# Patient Record
Sex: FEMALE | Race: OTHER | NOT HISPANIC OR LATINO
[De-identification: names, ages, dates, MRNs, and addresses within clinical notes are randomized per-mention and may not be internally consistent; named-entity substitution may affect disease eponyms.]

---

## 2020-12-08 ENCOUNTER — APPOINTMENT (OUTPATIENT)
Dept: OTOLARYNGOLOGY | Facility: CLINIC | Age: 55
End: 2020-12-08

## 2021-11-15 ENCOUNTER — EMERGENCY (EMERGENCY)
Facility: HOSPITAL | Age: 56
LOS: 1 days | Discharge: HOME | End: 2021-11-15
Attending: EMERGENCY MEDICINE
Payer: COMMERCIAL

## 2021-11-15 VITALS
OXYGEN SATURATION: 100 % | HEART RATE: 62 BPM | RESPIRATION RATE: 16 BRPM | SYSTOLIC BLOOD PRESSURE: 136 MMHG | HEIGHT: 67 IN | DIASTOLIC BLOOD PRESSURE: 86 MMHG | WEIGHT: 145.06 LBS

## 2021-11-15 DIAGNOSIS — Z85.819 PERSONAL HISTORY OF MALIGNANT NEOPLASM OF UNSPECIFIED SITE OF LIP, ORAL CAVITY, AND PHARYNX: Chronic | ICD-10-CM

## 2021-11-15 LAB
ALBUMIN SERPL ELPH-MCNC: 4.5 G/DL — SIGNIFICANT CHANGE UP (ref 3.3–5)
ALP SERPL-CCNC: 86 U/L — SIGNIFICANT CHANGE UP (ref 40–120)
ALT FLD-CCNC: 12 U/L — SIGNIFICANT CHANGE UP (ref 10–45)
ANION GAP SERPL CALC-SCNC: 12 MMOL/L — SIGNIFICANT CHANGE UP (ref 5–17)
AST SERPL-CCNC: 14 U/L — SIGNIFICANT CHANGE UP (ref 10–40)
BASOPHILS # BLD AUTO: 0.07 K/UL — SIGNIFICANT CHANGE UP (ref 0–0.2)
BASOPHILS NFR BLD AUTO: 1 % — SIGNIFICANT CHANGE UP (ref 0–2)
BILIRUB SERPL-MCNC: 0.4 MG/DL — SIGNIFICANT CHANGE UP (ref 0.2–1.2)
BUN SERPL-MCNC: 8 MG/DL — SIGNIFICANT CHANGE UP (ref 7–23)
CALCIUM SERPL-MCNC: 9.7 MG/DL — SIGNIFICANT CHANGE UP (ref 8.4–10.5)
CHLORIDE SERPL-SCNC: 105 MMOL/L — SIGNIFICANT CHANGE UP (ref 96–108)
CO2 SERPL-SCNC: 24 MMOL/L — SIGNIFICANT CHANGE UP (ref 22–31)
CREAT SERPL-MCNC: 0.49 MG/DL — LOW (ref 0.5–1.3)
EOSINOPHIL # BLD AUTO: 0.36 K/UL — SIGNIFICANT CHANGE UP (ref 0–0.5)
EOSINOPHIL NFR BLD AUTO: 5.1 % — SIGNIFICANT CHANGE UP (ref 0–6)
GLUCOSE SERPL-MCNC: 89 MG/DL — SIGNIFICANT CHANGE UP (ref 70–99)
HCG SERPL-ACNC: 2.2 MIU/ML — SIGNIFICANT CHANGE UP
HCT VFR BLD CALC: 38.9 % — SIGNIFICANT CHANGE UP (ref 34.5–45)
HGB BLD-MCNC: 12 G/DL — SIGNIFICANT CHANGE UP (ref 11.5–15.5)
IMM GRANULOCYTES NFR BLD AUTO: 0.3 % — SIGNIFICANT CHANGE UP (ref 0–1.5)
LIDOCAIN IGE QN: 21 U/L — SIGNIFICANT CHANGE UP (ref 7–60)
LYMPHOCYTES # BLD AUTO: 0.83 K/UL — LOW (ref 1–3.3)
LYMPHOCYTES # BLD AUTO: 11.9 % — LOW (ref 13–44)
MCHC RBC-ENTMCNC: 23.7 PG — LOW (ref 27–34)
MCHC RBC-ENTMCNC: 30.8 GM/DL — LOW (ref 32–36)
MCV RBC AUTO: 76.7 FL — LOW (ref 80–100)
MONOCYTES # BLD AUTO: 0.31 K/UL — SIGNIFICANT CHANGE UP (ref 0–0.9)
MONOCYTES NFR BLD AUTO: 4.4 % — SIGNIFICANT CHANGE UP (ref 2–14)
NEUTROPHILS # BLD AUTO: 5.41 K/UL — SIGNIFICANT CHANGE UP (ref 1.8–7.4)
NEUTROPHILS NFR BLD AUTO: 77.3 % — HIGH (ref 43–77)
NRBC # BLD: 0 /100 WBCS — SIGNIFICANT CHANGE UP (ref 0–0)
PLATELET # BLD AUTO: 271 K/UL — SIGNIFICANT CHANGE UP (ref 150–400)
POTASSIUM SERPL-MCNC: 3.5 MMOL/L — SIGNIFICANT CHANGE UP (ref 3.5–5.3)
POTASSIUM SERPL-SCNC: 3.5 MMOL/L — SIGNIFICANT CHANGE UP (ref 3.5–5.3)
PROT SERPL-MCNC: 6.9 G/DL — SIGNIFICANT CHANGE UP (ref 6–8.3)
RBC # BLD: 5.07 M/UL — SIGNIFICANT CHANGE UP (ref 3.8–5.2)
RBC # FLD: 14.9 % — HIGH (ref 10.3–14.5)
SARS-COV-2 RNA SPEC QL NAA+PROBE: SIGNIFICANT CHANGE UP
SODIUM SERPL-SCNC: 141 MMOL/L — SIGNIFICANT CHANGE UP (ref 135–145)
TROPONIN T, HIGH SENSITIVITY RESULT: <6 NG/L — SIGNIFICANT CHANGE UP (ref 0–51)
TROPONIN T, HIGH SENSITIVITY RESULT: <6 NG/L — SIGNIFICANT CHANGE UP (ref 0–51)
WBC # BLD: 7 K/UL — SIGNIFICANT CHANGE UP (ref 3.8–10.5)
WBC # FLD AUTO: 7 K/UL — SIGNIFICANT CHANGE UP (ref 3.8–10.5)

## 2021-11-15 PROCEDURE — 99220: CPT

## 2021-11-15 PROCEDURE — 93308 TTE F-UP OR LMTD: CPT | Mod: 26

## 2021-11-15 PROCEDURE — 93010 ELECTROCARDIOGRAM REPORT: CPT

## 2021-11-15 PROCEDURE — 71046 X-RAY EXAM CHEST 2 VIEWS: CPT | Mod: 26

## 2021-11-15 RX ORDER — SODIUM CHLORIDE 9 MG/ML
1000 INJECTION INTRAMUSCULAR; INTRAVENOUS; SUBCUTANEOUS ONCE
Refills: 0 | Status: COMPLETED | OUTPATIENT
Start: 2021-11-15 | End: 2021-11-15

## 2021-11-15 RX ORDER — KETOROLAC TROMETHAMINE 30 MG/ML
15 SYRINGE (ML) INJECTION ONCE
Refills: 0 | Status: DISCONTINUED | OUTPATIENT
Start: 2021-11-15 | End: 2021-11-15

## 2021-11-15 RX ORDER — ACETAMINOPHEN 500 MG
975 TABLET ORAL EVERY 6 HOURS
Refills: 0 | Status: DISCONTINUED | OUTPATIENT
Start: 2021-11-15 | End: 2021-11-19

## 2021-11-15 RX ORDER — ASPIRIN/CALCIUM CARB/MAGNESIUM 324 MG
324 TABLET ORAL ONCE
Refills: 0 | Status: COMPLETED | OUTPATIENT
Start: 2021-11-15 | End: 2021-11-15

## 2021-11-15 RX ADMIN — Medication 324 MILLIGRAM(S): at 20:10

## 2021-11-15 RX ADMIN — Medication 15 MILLIGRAM(S): at 21:29

## 2021-11-15 RX ADMIN — SODIUM CHLORIDE 1000 MILLILITER(S): 9 INJECTION INTRAMUSCULAR; INTRAVENOUS; SUBCUTANEOUS at 17:04

## 2021-11-15 NOTE — ED ADULT NURSE REASSESSMENT NOTE - NS ED NURSE REASSESS COMMENT FT1
Report received from MAT Rivas. Patient resting in bed, no current complaints. Patient accepted by CDU for CT coronary. Report given to CDU MAT Chin.     No nursing note written by previous RN

## 2021-11-15 NOTE — ED CDU PROVIDER INITIAL DAY NOTE - MEDICAL DECISION MAKING DETAILS
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 she had to lay down on the ground until sx self resolved, pt states she has strong fam hx of sudden cardiac death in her father, pt states she doesn't see a cardiologist, trop <6, pt nontoxic appearing, ambulatory to the bathroom, clear lungs soft abodment 2+ radial and DP pulse w/ no lower extremity edema. PLan for ctc and formal echo and referral for holter monitor

## 2021-11-15 NOTE — ED CDU PROVIDER INITIAL DAY NOTE - WR ORDER NAME 1
*   SEIZURE  PRECAUTIONS. A)  Avoid  Working  At   [employer] B)  Avoid  Working  With  Heavy machinery. C)  Avoid   Swimming,  Climbing  A  Ladder   Unattended. D)  Avoid   Driving   If  You   Have  A  Seizure. E)  Must   Be  Seizure  Free   For  At   Least   6 months,  Before   You  Can drive. F) Some times  Your  May  Feel  Seizure coming  Before  It  Begins. You  May feel             Strange smell or funny  Feeling  In  Your  Stomach,  Which is  Called   Aura. TIPS  TO  REDUCE/ PREVENT  SEIZURES         1. Take  Your  Anti seizure  Medications   As   Recommended. 2. Get   Enough   Sleep. Sleep  Deprivation   Can  Trigger  A  Seizure. 3. If   You   Have  A fever,  Treat  It  At  Once,  And  Contact   Your  Primary  Care Providers. 4. Avoid   Alcohol. 5. Avoid  Flashing  Lights,  Loud  Noises and  TV  And  Video  Games,           As   These  May  Trigger   Your  Seizures       6. Control  Your  Stress  And   Have  Adequate  Rest.       7.   If  You  Feel  A  Seizure  Coming   On :           A) warn people  Who  Are  With  You           B)  Make  Sure  There  Are  No  Sharp or  Hard  Objects  Around you. C)  Lay down  On  Your  Side  And  Relax. *   ADEQUATE   FLUID  INTAKE   AND  ELECTROLYTE  BALANCE           * KEEP  DAIRY  OF   THE  NEUROLOGICAL  SYMPTOMS          *  TO  MAINTAIN  REGULAR  SLEEP  WAKE  CYCLES.      *   TO  HAVE  ADEQUATE  REST  AND   SLEEP    HOURS.        *    AVOID  USAGE OF   TOBACCO,  EXCESSIVE  ALCOHOL                AND   ILLEGAL   SUBSTANCES,  IF  ANY *  Maintain   Healthy  Life Style    With   Periodic  Monitoring  Of      Any  Medical  Conditions  Including   Elevated  Blood  Pressure,  Lipid  Profile,     Blood  Sugar levels  And   Heart  Disease. *   Period   Screening  For  Cancers  Involving  Breast,  Colon,   lungs  And  Other  Organs  As  Applicable,  In consultation   With  Your  Primary Care Providers. *  If   There is  Any  Significant  Worsening   Of  Current  Symptoms  And  Or  If    Any additional  New  Neurological  Symptoms                 Or  Significant  Concerns   Should  Call  911 or  Go  To  Emergency  Department  For  Further  Immediate  Evaluation. Xray Chest 2 Views PA/Lat

## 2021-11-15 NOTE — ED PROVIDER NOTE - PHYSICAL EXAMINATION
GEN: Pt in NAD, A&O x3.  PSYCH: Affect appropriate.  EYES: Sclera white w/o injection.   ENT: Head NCAT. MMM. Neck supple FROM.  RESP: CTA b/l, no wheezes, rales, or rhonchi.   CARDIAC: RRR, clear distinct S1, S2, no appreciable murmurs.  ABD: Abdomen soft, non-tender. No pulsatile masses. No CVAT b/l.  VASC: 2+ radial and dorsalis pedis pulses b/l. No edema or calf tenderness.  NEURO: CN2-12 grossly intact. Normal and equal sensation and 5/5 strength UE and LE b/l. Pronator drift negative. Steady gait.  SKIN: No rashes on the trunk.

## 2021-11-15 NOTE — ED CDU PROVIDER INITIAL DAY NOTE - PROGRESS NOTE DETAILS
CDU PROGRESS NOTE JANICE RIGGINS: Received pt at sign-out. Case/plan reviewed. VSS. Pt resting in stretcher in NAD. Pt is aox3, speaking coherently, S1 S2 noted, RRR, lungs CTA b/l, BS x4 with soft, nontender abdomen. Pt denies chest pain, but reports having mild facial discomfort/pain. Will give Toradol 15mg IVP and continue to monitor overnight.

## 2021-11-15 NOTE — ED ADULT NURSE REASSESSMENT NOTE - NS ED NURSE REASSESS COMMENT FT1
Report received from RN Salvador & RN Idalia. Pt a & o x 4, able to follow commands, pt speech soft (d/t prior oropharyngeal sx). Breathing spontaneous & nonlabored. On Cardiac monitor, NSR 60's. Abdomen soft & nondistended. IV site patent, no signs of phlebitis, flushing without difficulty. Pt denies chest pain & SOB, states she still feels weak.      Pt explained plan of care in CDU and plan for CT Coronaries in the am, pt verbalized understanding. Pt states she is hungry but is on a soft food vegetarian diet, dietary contacted and made aware. Call bell within reach, bed in lowest position. Report received from RN Salvador & MAT Friedman. Pt a & o x 4, able to follow commands, pt speech soft, unable to move L side of mouth (d/t prior oropharyngeal sx). Breathing spontaneous & nonlabored. On Cardiac monitor, NSR 60's. Abdomen soft & nondistended. IV site patent, no signs of phlebitis, flushing without difficulty. Pt denies chest pain & SOB, states she still feels weak.      Pt explained plan of care in CDU and plan for CT Coronaries in the am, pt verbalized understanding. Pt states she is hungry but is on a soft food vegetarian diet, dietary contacted and made aware. Call bell within reach, bed in lowest position. Report received from RN Salvador & MAT Friedman. Pt a & o x 4, able to follow commands, PERRL 3mm, pt speech soft, unable to move L side of mouth (d/t prior oropharyngeal sx). Breathing spontaneous & nonlabored. On Cardiac monitor, NSR 60's. Abdomen soft & nondistended. Strength 5/5 x 4 extremities. IV site patent, no signs of phlebitis, flushing without difficulty. Pt denies chest pain & SOB, states she still feels weak.      Pt explained plan of care in CDU and plan for CT Coronaries in the am, pt verbalized understanding. Pt states she is hungry but is on a soft food vegetarian diet, dietary contacted and made aware. Call bell within reach, bed in lowest position.

## 2021-11-15 NOTE — ED CDU PROVIDER INITIAL DAY NOTE - OBJECTIVE STATEMENT
55 y/o F PMHx of oropharyngeal CA completed RT, no chemo, not on active treatment currently, presents c/o near syncopal episode at home today. Pt notes nausea, diaphoresis, lightheadedness, needed to lay down on the ground until sxs resolved, pt reports never losing consciousness. Pt denies preceding or current CP, SOB, palpitations. pt has mild HA currently but denies preceding HA. pt denies numbness, paresthesias, weakness, difficulty ambulating, visual change, abd pain, leg pain/swelling. Pt notes +FHx of sudden cardiac death- father in her 40s. Pt is non-smoker. Pt does note her young son is sick at home with URI sxs, COVID and flu negative.  No cardiologist.  ED Course: Labs non-actionable, Trop <6. CXR without acute pathology. POCUS TTE without pericardial effusion or visualized WMA. Pt sent to CDU for frequent eval, CTC, and tele monitoring.

## 2021-11-15 NOTE — ED CDU PROVIDER INITIAL DAY NOTE - NSICDXPASTSURGICALHX_GEN_ALL_CORE_FT
PAST SURGICAL HISTORY:  History of oropharyngeal cancer L side s/p resection and L forearm skin graft

## 2021-11-15 NOTE — ED PROVIDER NOTE - OBJECTIVE STATEMENT
55 y/o F PMHx of oropharyngeal CA completed RT, no chemo, not on active treatment currently, presents c/o near syncopal episode at home today. Pt notes nausea, diaphoresis, lightheadedness, needed to lay down on the ground until sxs resolved, pt reports never losing consciousness. Pt denies preceding or current CP, SOB, palpitations. pt has mild HA currently but denies preceding HA. pt denies numbness, paresthesias, weakness, difficulty ambulating, visual change, abd pain, leg pain/swelling. Pt notes +FHx of sudden cardiac death- father in her 40s. Pt is non-smoker. Pt does note her young son is sick at home with URI sxs, COVID and flu negative.

## 2021-11-15 NOTE — ED PROVIDER NOTE - PROGRESS NOTE DETAILS
poics nml ef, no pericardial effusion , no rv strain -- ivc < 2 cm Trop <6. labs, images and plan d/w pt. Given strong Fhx would prefer pt to have further eval for CAD including CTC, pt agreeable to CDU for observation, frequent eval, tele monitoring, CTC, and TTE. all questions answered. pt ready and stable for CDU. -Sam Roberts PA-C

## 2021-11-15 NOTE — ED PROCEDURE NOTE - PROCEDURE ADDITIONAL DETAILS
Emergency Department Focused Ultrasound performed at patient's bedside.  The complete report can be found in PACS.
Peripheral IV access in the Emergency Department obtained under ultrasound guidance with dark nonpulsatile blood return.  Catheter was flushed afterwards without any resistance or resulting extravasation.  IV catheter confirmed in compressible vein after insertion. 18 gauge catheter placed in a peripheral vein in the right upper extremity.

## 2021-11-15 NOTE — ED CDU PROVIDER DISPOSITION NOTE - CLINICAL COURSE
55 y/o F PMHx of oropharyngeal CA completed RT, no chemo, not on active treatment currently, presents c/o near syncopal episode at home today. Pt notes nausea, diaphoresis, lightheadedness, needed to lay down on the ground until sxs resolved, pt reports never losing consciousness. Pt denies preceding or current CP, SOB, palpitations. pt has mild HA currently but denies preceding HA. pt denies numbness, paresthesias, weakness, difficulty ambulating, visual change, abd pain, leg pain/swelling. Pt notes +FHx of sudden cardiac death- father in her 40s. Pt is non-smoker. Pt does note her young son is sick at home with URI sxs, COVID and flu negative.  ED Course: Labs non-actionable, Trop <6. CXR without acute pathology. POCUS TTE without pericardial effusion or visualized WMA. Pt sent to CDU for frequent eval, CTC, and tele monitoring.  CDU Course: CTC showed____. 57 y/o F PMHx of oropharyngeal CA completed RT, no chemo, not on active treatment currently, presents c/o near syncopal episode at home today. Pt notes nausea, diaphoresis, lightheadedness, needed to lay down on the ground until sxs resolved, pt reports never losing consciousness. Pt denies preceding or current CP, SOB, palpitations. pt has mild HA currently but denies preceding HA. pt denies numbness, paresthesias, weakness, difficulty ambulating, visual change, abd pain, leg pain/swelling. Pt notes +FHx of sudden cardiac death- father in her 40s. Pt is non-smoker. Pt does note her young son is sick at home with URI sxs, COVID and flu negative.  ED Course: Labs non-actionable, Trop <6. CXR without acute pathology. POCUS TTE without pericardial effusion or visualized WMA. Pt sent to CDU for frequent eval, CTC, and tele monitoring.  CDU Course: CTC showed normal coronaries. incidental small L atria divertcula reviewed with cards and can be f/up outpt. no events on tele. echo normal. pt to f/up with cards outpt

## 2021-11-15 NOTE — ED CDU PROVIDER DISPOSITION NOTE - NSFOLLOWUPINSTRUCTIONS_ED_ALL_ED_FT
You were seen in the ER for near syncope (almost passing out). Your discharge diagnosis is____. Please read all of the attached information below.    1. Follow up with your PCP in 1-2 days.       Additionally please follow up with your cardiologist  You may follow-up in the cardiology clinic ([phone]) within 2-3 days.     2. Show copies of your reports given to you.       Please take Aspirin 81mg over the counter daily until further evaluation.        Take all of your other medications as previously prescribed.     3. Please return to the ED immediately if you develop any worsening or continued chest pain, shortness of breath, palpitations, weakness, nausea/vomiting, lightheadedness, or for any other concerning symptoms. 1. Stay hydrated.  2. Continue Current Home Medications  3. Follow up with your PCP in 1-2 days. Follow up with Cardiologist Dr. Yvon Powell in 3-5 days. (Bring printed results to your doctor visits).  **follow up incidental result of small L atria divertcula found on CT Coronaries***  4. Return if symptoms, worsen, fever, weakness, chest pain, difficulty breathing, dizziness and all other concerns.

## 2021-11-15 NOTE — ED CDU PROVIDER INITIAL DAY NOTE - PHYSICAL EXAMINATION
GEN: Pt in NAD, A&O x3.  PSYCH: Affect appropriate.  EYES: Sclera white w/o injection.   ENT: Head NCAT. MMM. Neck supple FROM.  RESP: CTA b/l, no wheezes, rales, or rhonchi.   CARDIAC: RRR, clear distinct S1, S2, no appreciable murmurs.  ABD: Abdomen soft, non-tender.  VASC: 2+ radial and dorsalis pedis pulses b/l. No edema or calf tenderness.  NEURO: CN2-12 grossly intact. Normal and equal sensation and 5/5 strength UE and LE b/l. Pronator drift negative. Normal gait.   SKIN: No rashes on the trunk.

## 2021-11-15 NOTE — ED CDU PROVIDER DISPOSITION NOTE - PATIENT PORTAL LINK FT
You can access the FollowMyHealth Patient Portal offered by [facility] by registering at the following website: [email or web address]. By joining [employer]’s FollowMyHealth portal, you will also be able to view your health information using other applications (apps) compatible with our system.

## 2021-11-15 NOTE — ED PROVIDER NOTE - CLINICAL SUMMARY MEDICAL DECISION MAKING FREE TEXT BOX
tammy - pt with ho op ca sp resection and rtx in past --- today with nausea and lightheadedness with near synopal episode still feels general malaise -  pos sick contact w uri covid neg - this week--- pos strong fam ho cad with scd v mi of father in 40's --- tele monitor ekg non ischemic   - no signs of lvh or brugada- consider stress and echo-- iv fluids tylenol and reeval

## 2021-11-15 NOTE — ED CDU PROVIDER INITIAL DAY NOTE - NSICDXFAMILYHX_GEN_ALL_CORE_FT
FAMILY HISTORY:  Father  Still living? No  FH: myocardial infarction, Age at diagnosis: Age Unknown

## 2021-11-15 NOTE — ED CDU PROVIDER DISPOSITION NOTE - ATTENDING CONTRIBUTION TO CARE
56 F w/ hx of oropharyngeal CA sp RT w/ skin grafting here w/ near syncope and felt nauseous w/ diaphoresis and lightheaded. she had to lay down on the ground until sx self resolved, pt states she has strong fam hx of sudden cardiac death in her father, pt states she doesn't see a cardiologist, trop <6.  Pt sent to CDU for CTC and echo.  PT feeling well this morning, no chest pain, heart rrr, lungs cta, abd soft ntnd.      - Zuly Mckeon, DO

## 2021-11-16 VITALS — TEMPERATURE: 96 F

## 2021-11-16 LAB
A1C WITH ESTIMATED AVERAGE GLUCOSE RESULT: 5.4 % — SIGNIFICANT CHANGE UP (ref 4–5.6)
CHOLEST SERPL-MCNC: 167 MG/DL — SIGNIFICANT CHANGE UP
ESTIMATED AVERAGE GLUCOSE: 108 MG/DL — SIGNIFICANT CHANGE UP (ref 68–114)
HDLC SERPL-MCNC: 56 MG/DL — SIGNIFICANT CHANGE UP
LIPID PNL WITH DIRECT LDL SERPL: 101 MG/DL — HIGH
NON HDL CHOLESTEROL: 111 MG/DL — SIGNIFICANT CHANGE UP
TRIGL SERPL-MCNC: 53 MG/DL — SIGNIFICANT CHANGE UP

## 2021-11-16 PROCEDURE — 84702 CHORIONIC GONADOTROPIN TEST: CPT

## 2021-11-16 PROCEDURE — 80053 COMPREHEN METABOLIC PANEL: CPT

## 2021-11-16 PROCEDURE — 96374 THER/PROPH/DIAG INJ IV PUSH: CPT | Mod: XU

## 2021-11-16 PROCEDURE — U0005: CPT

## 2021-11-16 PROCEDURE — 96376 TX/PRO/DX INJ SAME DRUG ADON: CPT

## 2021-11-16 PROCEDURE — 85025 COMPLETE CBC W/AUTO DIFF WBC: CPT

## 2021-11-16 PROCEDURE — 83690 ASSAY OF LIPASE: CPT

## 2021-11-16 PROCEDURE — 93005 ELECTROCARDIOGRAM TRACING: CPT | Mod: XU

## 2021-11-16 PROCEDURE — 83036 HEMOGLOBIN GLYCOSYLATED A1C: CPT

## 2021-11-16 PROCEDURE — 75574 CT ANGIO HRT W/3D IMAGE: CPT | Mod: MA

## 2021-11-16 PROCEDURE — 80061 LIPID PANEL: CPT

## 2021-11-16 PROCEDURE — 71046 X-RAY EXAM CHEST 2 VIEWS: CPT

## 2021-11-16 PROCEDURE — U0003: CPT

## 2021-11-16 PROCEDURE — 75574 CT ANGIO HRT W/3D IMAGE: CPT | Mod: 26,MA

## 2021-11-16 PROCEDURE — 99284 EMERGENCY DEPT VISIT MOD MDM: CPT | Mod: 25

## 2021-11-16 PROCEDURE — 93308 TTE F-UP OR LMTD: CPT

## 2021-11-16 PROCEDURE — G0378: CPT

## 2021-11-16 PROCEDURE — 36415 COLL VENOUS BLD VENIPUNCTURE: CPT

## 2021-11-16 PROCEDURE — 99217: CPT

## 2021-11-16 PROCEDURE — 84484 ASSAY OF TROPONIN QUANT: CPT

## 2021-11-16 PROCEDURE — 93306 TTE W/DOPPLER COMPLETE: CPT

## 2021-11-16 PROCEDURE — 93306 TTE W/DOPPLER COMPLETE: CPT | Mod: 26

## 2021-11-16 RX ORDER — IBUPROFEN 200 MG
600 TABLET ORAL ONCE
Refills: 0 | Status: COMPLETED | OUTPATIENT
Start: 2021-11-16 | End: 2021-11-16

## 2021-11-16 RX ORDER — KETOROLAC TROMETHAMINE 30 MG/ML
15 SYRINGE (ML) INJECTION ONCE
Refills: 0 | Status: DISCONTINUED | OUTPATIENT
Start: 2021-11-16 | End: 2021-11-16

## 2021-11-16 RX ORDER — METOPROLOL TARTRATE 50 MG
50 TABLET ORAL ONCE
Refills: 0 | Status: COMPLETED | OUTPATIENT
Start: 2021-11-16 | End: 2021-11-16

## 2021-11-16 RX ADMIN — Medication 15 MILLIGRAM(S): at 01:18

## 2021-11-16 RX ADMIN — Medication 15 MILLIGRAM(S): at 13:36

## 2021-11-16 RX ADMIN — Medication 15 MILLIGRAM(S): at 06:05

## 2021-11-16 RX ADMIN — Medication 50 MILLIGRAM(S): at 09:22

## 2021-11-16 NOTE — ED CDU PROVIDER SUBSEQUENT DAY NOTE - HISTORY
CDU PROGRESS NOTE JANICE RIGGINS: Pt resting comfortably, feeling well without complaint. NAD, VSS. No events on telemetry. Will continue to monitor, TTE in am.

## 2021-11-16 NOTE — ED CDU PROVIDER SUBSEQUENT DAY NOTE - MEDICAL DECISION MAKING DETAILS
56 F w/ hx of oropharyngeal CA sp RT w/ skin grafting here w/ near syncope and felt nauseous w/ diaphoresis and lightheaded. she had to lay down on the ground until sx self resolved, pt states she has strong fam hx of sudden cardiac death in her father, pt states she doesn't see a cardiologist, trop <6.  Pt sent to CDU for CTC and echo.  PT feeling well this morning, no chest pain, heart rrr, lungs cta, abd soft ntnd.  Awaiting testing at this time.  - Zuly Mckeon, DO

## 2021-11-16 NOTE — ED ADULT NURSE REASSESSMENT NOTE - NS ED NURSE REASSESS COMMENT FT1
0800 - Patient is alert and oriented X 4, follows commands and independent.  Denies pain, chest pain and N/V.  Neuro intact, PERRLA, and strong upper and lower extremities.  IV clean dry and intact.  VSS and NSR.  Awaiting CT and continue to monitor in CDU.  Call bell placed within the reach of the patient.  Educated on use of call bell to make needs known to staff.

## 2021-11-16 NOTE — ED CDU PROVIDER SUBSEQUENT DAY NOTE - PROGRESS NOTE DETAILS
CDU NOTE JANICE Marie: pt resting comfortably, feels well without complaint. NAD VSS. no events on tele. CDU NOTE JANICE Marie: pt resting comfortably, feels well without complaint. NAD VSS. no events on tele.  ECHO- normal  CTC- did not cross over into our system but received verbal from radiology resident Dr. Wong who read the result done by Attending Radiologist, normal coronaries. +small L Atria Diverticula, R heart dominant. spoke to Dr. Powell (unattached cards)- based on that result, if no filling defect, pt can f/up outpt in his office.   as per Dr. Mckeon, pt stable for d/c home

## 2021-11-16 NOTE — ED ADULT NURSE REASSESSMENT NOTE - NS ED NURSE REASSESS COMMENT FT1
Pt discharged as per provider. Teaching provided, discharge paperwork signed. Pt verbalizes understanding to discharge orders & will follow up with PMD post discharge. IV lock removed. No bleeding noted. Pt stable upon discharge.

## 2023-04-10 NOTE — ED ADULT NURSE REASSESSMENT NOTE - SYMPTOMS
syncope none Medical Necessity Statement: Based on my medical judgement, Mohs surgery is the most appropriate treatment for this cancer compared to other treatments.